# Patient Record
Sex: FEMALE | URBAN - METROPOLITAN AREA
[De-identification: names, ages, dates, MRNs, and addresses within clinical notes are randomized per-mention and may not be internally consistent; named-entity substitution may affect disease eponyms.]

---

## 2020-07-03 ENCOUNTER — NURSE TRIAGE (OUTPATIENT)
Dept: NURSING | Facility: CLINIC | Age: 75
End: 2020-07-03

## 2020-07-03 ENCOUNTER — COMMUNICATION - HEALTHEAST (OUTPATIENT)
Dept: SCHEDULING | Facility: CLINIC | Age: 75
End: 2020-07-03

## 2020-07-03 NOTE — TELEPHONE ENCOUNTER
Len pharmacist is calling regarding patient requesting a refill of a medication. Advised I don't see any records of this patient in our system. Pharmacist states the PCP is Dr Alix Arthur. Writer looked up this doctor and found out she works out of [facility]. Advised caller we are not [facility] so that's why we don't have record of the patient. Pharmacist hung up.     Nona Alanis RN/CH[facility]

## 2021-06-09 NOTE — TELEPHONE ENCOUNTER
Eduardo pharmacist is calling regarding patient requesting a refill of a medication. Advised I don't see any records of this patient in our system. Pharmacist states the PCP is Dr Debbie Wilson. Writer looked up this doctor and found out she works out of [facility]. Advised caller we are not [facility] so that's why we don't have record of the patient. Pharmacist hung up.      Carol Ann Oswald RN/RUDDY [facility]